# Patient Record
Sex: FEMALE
[De-identification: names, ages, dates, MRNs, and addresses within clinical notes are randomized per-mention and may not be internally consistent; named-entity substitution may affect disease eponyms.]

---

## 2020-02-20 PROBLEM — Z00.00 ENCOUNTER FOR PREVENTIVE HEALTH EXAMINATION: Status: ACTIVE | Noted: 2020-02-20

## 2020-03-06 ENCOUNTER — APPOINTMENT (OUTPATIENT)
Dept: VASCULAR SURGERY | Facility: CLINIC | Age: 45
End: 2020-03-06
Payer: COMMERCIAL

## 2020-03-06 VITALS
HEART RATE: 64 BPM | SYSTOLIC BLOOD PRESSURE: 112 MMHG | BODY MASS INDEX: 26.5 KG/M2 | RESPIRATION RATE: 16 BRPM | DIASTOLIC BLOOD PRESSURE: 66 MMHG | HEIGHT: 62 IN | WEIGHT: 144 LBS

## 2020-03-06 DIAGNOSIS — Z87.442 PERSONAL HISTORY OF URINARY CALCULI: ICD-10-CM

## 2020-03-06 DIAGNOSIS — Z78.9 OTHER SPECIFIED HEALTH STATUS: ICD-10-CM

## 2020-03-06 DIAGNOSIS — Z82.49 FAMILY HISTORY OF ISCHEMIC HEART DISEASE AND OTHER DISEASES OF THE CIRCULATORY SYSTEM: ICD-10-CM

## 2020-03-06 DIAGNOSIS — Z87.09 PERSONAL HISTORY OF OTHER DISEASES OF THE RESPIRATORY SYSTEM: ICD-10-CM

## 2020-03-06 DIAGNOSIS — Z77.22 CONTACT WITH AND (SUSPECTED) EXPOSURE TO ENVIRONMENTAL TOBACCO SMOKE (ACUTE) (CHRONIC): ICD-10-CM

## 2020-03-06 DIAGNOSIS — Z80.1 FAMILY HISTORY OF MALIGNANT NEOPLASM OF TRACHEA, BRONCHUS AND LUNG: ICD-10-CM

## 2020-03-06 PROCEDURE — 99204 OFFICE O/P NEW MOD 45 MIN: CPT

## 2020-03-06 RX ORDER — CHROMIUM 200 MCG
TABLET ORAL
Refills: 0 | Status: ACTIVE | COMMUNITY

## 2020-03-06 RX ORDER — MIRABEGRON 25 MG/1
25 TABLET, FILM COATED, EXTENDED RELEASE ORAL
Refills: 0 | Status: ACTIVE | COMMUNITY

## 2020-03-06 RX ORDER — MONTELUKAST SODIUM 10 MG/1
10 TABLET, FILM COATED ORAL
Refills: 0 | Status: ACTIVE | COMMUNITY

## 2020-03-06 RX ORDER — AZELASTINE HYDROCHLORIDE AND FLUTICASONE PROPIONATE 137; 50 UG/1; UG/1
137-50 SPRAY, METERED NASAL
Refills: 0 | Status: ACTIVE | COMMUNITY

## 2020-03-06 NOTE — REVIEW OF SYSTEMS
[Fever] : no fever [Chills] : no chills [Leg Claudication] : no intermittent leg claudication [Lower Ext Edema] : lower extremity edema [Limb Pain] : limb pain [Limb Swelling] : limb swelling [Limb Weakness] : no limb weakness [Difficulty Walking] : no difficulty walking

## 2020-03-06 NOTE — PHYSICAL EXAM
[JVD] : no jugular venous distention  [Normal Breath Sounds] : Normal breath sounds [2+] : left 2+ [Ankle Swelling (On Exam)] : present [Ankle Swelling On The Right] : mild [Varicose Veins Of Lower Extremities] : present [Varicose Veins Of The Right Leg] : of the right leg [Ankle Swelling On The Left] : moderate [] : bilaterally [Alert] : alert [Oriented to Person] : oriented to person [Oriented to Place] : oriented to place [Oriented to Time] : oriented to time [de-identified] : awake and Alert [de-identified] : varicose veins right posterior calf > 3mm, spider veins bl [de-identified] : appropriate

## 2020-03-06 NOTE — HISTORY OF PRESENT ILLNESS
[FreeTextEntry1] : 45 year old female  presents to the office with a chief complaint of right lower extremity edema and leg pain associated with varicose veins. She reports that her pain and swelling worsens with prolonged standing. She is a  and stands for prolonged periods of time. She  denies a history of DVT or SVT. She does not wear compression stockings. She reports that her edema improves with elevation. Her mother had varicose veins.\par

## 2020-03-06 NOTE — ASSESSMENT
[FreeTextEntry1] : 45 year  female with right lower extremity pain and swelling associated with varicose veins which worsens through out the day. I am uncertain if she has venous insufficiency. I recommended that she obtain and begin to wear compression stockings. She will elevate her leg as much as possible. She was scheduled for a venous duplex. She will follow up with me when the results of the duplex are available.

## 2020-03-16 ENCOUNTER — APPOINTMENT (OUTPATIENT)
Dept: HEART AND VASCULAR | Facility: CLINIC | Age: 45
End: 2020-03-16
Payer: COMMERCIAL

## 2020-03-16 PROCEDURE — 93970 EXTREMITY STUDY: CPT

## 2020-04-17 ENCOUNTER — APPOINTMENT (OUTPATIENT)
Dept: VASCULAR SURGERY | Facility: CLINIC | Age: 45
End: 2020-04-17

## 2020-05-18 ENCOUNTER — APPOINTMENT (OUTPATIENT)
Dept: VASCULAR SURGERY | Facility: CLINIC | Age: 45
End: 2020-05-18
Payer: COMMERCIAL

## 2020-05-18 PROCEDURE — 99202 OFFICE O/P NEW SF 15 MIN: CPT | Mod: 95

## 2020-05-18 NOTE — ASSESSMENT
[FreeTextEntry1] : 46 yo female with symptomatic venous insufficiency. She has continued pain and swelling in the right leg despite wearing a compression stocking. She has venous insufficiency on the right and a markedly enlarged right GSV. I recommended that she undergo an ablation of her right GSV. The risks and benefits of the procedure were discussed with the patient who agrees to proceed. She will continue to wear a compression stocking on a daily basis until the time of the procedure.

## 2020-05-18 NOTE — PHYSICAL EXAM
[Ankle Swelling (On Exam)] : present [Normal Breath Sounds] : Normal breath sounds [Varicose Veins Of Lower Extremities] : present [Ankle Swelling On The Right] : mild [Varicose Veins Of The Right Leg] : of the right leg [Alert] : alert [Ankle Swelling On The Left] : moderate [Oriented to Person] : oriented to person [Oriented to Place] : oriented to place [Oriented to Time] : oriented to time [de-identified] : Awake and Alert [de-identified] : varicose veins right posterior calf > 3mm [de-identified] : appropriate

## 2020-05-18 NOTE — REVIEW OF SYSTEMS
[Chills] : no chills [Fever] : no fever [Lower Ext Edema] : lower extremity edema [Limb Pain] : limb pain [Limb Swelling] : limb swelling [Limb Weakness] : no limb weakness [Difficulty Walking] : no difficulty walking

## 2020-05-18 NOTE — HISTORY OF PRESENT ILLNESS
[FreeTextEntry1] : 45 year old female  presents to the office with a chief complaint of right lower extremity edema and leg pain associated with varicose veins. She reports that her pain and swelling worsens with prolonged standing. She is a  and stands for prolonged periods of time. She  denies a history of DVT or SVT. She does not wear compression stockings. She reports that her edema improves with elevation. Her mother had varicose veins.\par  [de-identified] : Patient reports that she has been wearing a compression stocking on a daily basis. She reports increased pain and swelling in her right leg despite wearing a compression stocking. She reports that the pain is now waking her up in the middle of the night. She presents to discuss the results of her venous ultrasound and further treatment options.

## 2020-06-24 ENCOUNTER — APPOINTMENT (OUTPATIENT)
Dept: VASCULAR SURGERY | Facility: CLINIC | Age: 45
End: 2020-06-24
Payer: COMMERCIAL

## 2020-06-24 PROCEDURE — 36475 ENDOVENOUS RF 1ST VEIN: CPT

## 2020-06-24 RX ORDER — IBUPROFEN 600 MG/1
600 TABLET, FILM COATED ORAL 3 TIMES DAILY
Qty: 30 | Refills: 3 | Status: ACTIVE | COMMUNITY
Start: 2020-06-24 | End: 1900-01-01

## 2020-06-24 NOTE — PROCEDURE
[FreeTextEntry1] : RFA right GSV [FreeTextEntry3] : The patient was seen in the waiting room where the consent was obtained. She was then taken to the procedure room where a timeout was called to verify her identity and the laterality of the procedure. The patient's right leg was then interrogated under ultrasound and an appropriate place for cannulation was identified. Her right  leg was then prepped and draped in the standard fashion. Under ultrasound guidance the patient was given an injection of 1% plain lidocaine in the proximal calf. Access was then obtained with a 7 FR sheath in the standard fashion. Catheter was placed to the SFJ and withdrawn 3.0 cm from the junction. Patient was then given tumescence around the saphenous vein under ultrasound guidance. The ablation was then performed in the standard fashion. Steri strips were applied to catheter insertion site. Leg was wrapped in kerlix and an ace wrap. Patient was discharged in stable condition.\par  [FreeTextEntry2] : Symptomatic Venous Insufficiency

## 2020-06-24 NOTE — ADDENDUM
[FreeTextEntry1] : She tolerated the procedure. She verbalized understanding of the post procedure instructions.\par \par She will follow up in 1 week.\par

## 2020-07-06 ENCOUNTER — APPOINTMENT (OUTPATIENT)
Dept: VASCULAR SURGERY | Facility: CLINIC | Age: 45
End: 2020-07-06

## 2020-07-13 ENCOUNTER — APPOINTMENT (OUTPATIENT)
Dept: VASCULAR SURGERY | Facility: CLINIC | Age: 45
End: 2020-07-13
Payer: COMMERCIAL

## 2020-07-13 PROCEDURE — 99213 OFFICE O/P EST LOW 20 MIN: CPT

## 2020-07-13 PROCEDURE — 93971 EXTREMITY STUDY: CPT

## 2020-07-14 NOTE — PHYSICAL EXAM
[JVD] : no jugular venous distention  [Normal Breath Sounds] : Normal breath sounds [Normal Rate and Rhythm] : normal rate and rhythm [2+] : right 2+ [Ankle Swelling (On Exam)] : not present [Ankle Swelling Bilaterally] : bilaterally  [Varicose Veins Of Lower Extremities] : bilaterally [Ankle Swelling On The Right] : mild [] : bilaterally [Alert] : alert [Oriented to Person] : oriented to person [Oriented to Place] : oriented to place [Oriented to Time] : oriented to time [de-identified] : Awake and Alert [de-identified] : catheter insertion site with ecchymosis. spider veins bl [de-identified] : appropriate

## 2020-07-14 NOTE — HISTORY OF PRESENT ILLNESS
[FreeTextEntry1] : 44 yo female s/p RFA of the right GSV for symptomatic venous insufficiency. She reports that she is doing well. She denies fever or chills. She reports that she has bruising of the right calf. She is here to undergo a post procedure duplex. She took Motrin as prescribed. She continues to complain of spider veins bilaterally. She is interested in discussing additional treatment of her spider veins.

## 2020-07-14 NOTE — ASSESSMENT
[FreeTextEntry1] : 44 yo female s/p ablation of her right GSV. Her GSV is ablated 8.0 cm fro her SFJ. I do not think there is an indication to do anything at this time. She is an excellent candidate for sclerotherapy. The risks and benefits of sclerotherapy were discussed with the patient and she agrees to proceed. She will follow up in several weeks. She will bring compression stockings to her next visit.

## 2020-07-14 NOTE — REVIEW OF SYSTEMS
[Fever] : no fever [Chills] : no chills [Lower Ext Edema] : no lower extremity edema [Limb Pain] : no limb pain [Limb Swelling] : no limb swelling [Limb Weakness] : no limb weakness [Difficulty Walking] : no difficulty walking [de-identified] : ecchymosis

## 2020-08-17 ENCOUNTER — APPOINTMENT (OUTPATIENT)
Dept: VASCULAR SURGERY | Facility: CLINIC | Age: 45
End: 2020-08-17
Payer: COMMERCIAL

## 2020-08-17 VITALS — SYSTOLIC BLOOD PRESSURE: 115 MMHG | DIASTOLIC BLOOD PRESSURE: 77 MMHG

## 2020-08-17 PROCEDURE — 36471 NJX SCLRSNT MLT INCMPTNT VN: CPT

## 2020-08-17 NOTE — PROCEDURE
[FreeTextEntry2] : Spider Veins [FreeTextEntry1] : Sclerotherapy [FreeTextEntry3] : After consent was obtained. The patient underwent bilateral lower extremity sclerotherapy with 0.5% Polidocanol. The patient tolerated the procedure well. The patient was placed in compression stockings bilaterally. The patient was instructed to wear compression stockings continuously for the next 3 days and on a daily basis for 2 weeks. The patient will followup in 2 weeks sooner if they develop a problem.\par

## 2020-08-21 ENCOUNTER — APPOINTMENT (OUTPATIENT)
Dept: HEART AND VASCULAR | Facility: CLINIC | Age: 45
End: 2020-08-21
Payer: COMMERCIAL

## 2020-08-21 ENCOUNTER — APPOINTMENT (OUTPATIENT)
Dept: VASCULAR SURGERY | Facility: CLINIC | Age: 45
End: 2020-08-21
Payer: COMMERCIAL

## 2020-08-21 PROCEDURE — 99213 OFFICE O/P EST LOW 20 MIN: CPT

## 2020-08-21 PROCEDURE — 93971 EXTREMITY STUDY: CPT

## 2020-08-21 NOTE — REVIEW OF SYSTEMS
[Fever] : no fever [Chills] : no chills [Leg Claudication] : no intermittent leg claudication [Limb Pain] : no limb pain [Lower Ext Edema] : no lower extremity edema [Limb Swelling] : no limb swelling

## 2020-08-21 NOTE — HISTORY OF PRESENT ILLNESS
[FreeTextEntry1] : 44 yo female returns in follow up. She is s/p an ablation of her right GSV. HEr post procedure duplex demonstrated that the vein was ablated 7.5 cm from the SFJ. She reports some continued bruising of the right leg. She is s/p sclerotherapy and reports that she has been wearing the compression stockings as prescribed.

## 2020-08-21 NOTE — PHYSICAL EXAM
[Normal Breath Sounds] : Normal breath sounds [2+] : right 2+ [Ankle Swelling Bilaterally] : bilaterally  [Oriented to Place] : oriented to place [Alert] : alert [Oriented to Person] : oriented to person [Oriented to Time] : oriented to time [JVD] : no jugular venous distention  [Ankle Swelling (On Exam)] : not present [de-identified] : Awake and Alert [de-identified] : appropriate [de-identified] : mid calf with resolving ecchymosis , spider veins bl

## 2020-08-21 NOTE — ASSESSMENT
[FreeTextEntry1] : 46 yo female s/p ablation and sclerotherapy. Her GSV remains ablated in her mid thigh. There was no reflux seen proximally. I think it would be reasonable to continue to observe. If her edema worsens we will recheck a venous duplex and consider an ablation of the proximal segment. She will continue to wear compression stockings and follow up in several months for repeat sclerotherapy.

## 2020-08-21 NOTE — DATA REVIEWED
[FreeTextEntry1] : Venous Duplex right - GSV ablated  in mid thigh distally, no reflux in proximal thigh

## 2020-09-02 LAB
SARS-COV-2 IGG SERPL IA-ACNC: 0.08 INDEX
SARS-COV-2 IGG SERPL QL IA: NEGATIVE

## 2020-11-20 ENCOUNTER — APPOINTMENT (OUTPATIENT)
Dept: VASCULAR SURGERY | Facility: CLINIC | Age: 45
End: 2020-11-20
Payer: COMMERCIAL

## 2020-11-20 VITALS
HEIGHT: 62 IN | SYSTOLIC BLOOD PRESSURE: 102 MMHG | BODY MASS INDEX: 27.6 KG/M2 | DIASTOLIC BLOOD PRESSURE: 67 MMHG | RESPIRATION RATE: 16 BRPM | OXYGEN SATURATION: 97 % | HEART RATE: 64 BPM | WEIGHT: 150 LBS

## 2020-11-20 DIAGNOSIS — I87.2 VENOUS INSUFFICIENCY (CHRONIC) (PERIPHERAL): ICD-10-CM

## 2020-11-20 PROCEDURE — 99213 OFFICE O/P EST LOW 20 MIN: CPT

## 2020-11-20 RX ORDER — PSYLLIUM HUSK 0.4 G
CAPSULE ORAL
Refills: 0 | Status: DISCONTINUED | COMMUNITY
End: 2020-11-20

## 2020-11-22 PROBLEM — I87.2 VENOUS INSUFFICIENCY OF RIGHT LOWER EXTREMITY: Status: ACTIVE | Noted: 2020-03-06

## 2020-11-22 NOTE — PHYSICAL EXAM
[JVD] : no jugular venous distention  [Normal Breath Sounds] : Normal breath sounds [2+] : right 2+ [Ankle Swelling (On Exam)] : not present [Ankle Swelling Bilaterally] : bilaterally  [Varicose Veins Of Lower Extremities] : bilaterally [Ankle Swelling On The Right] : mild [Alert] : alert [Oriented to Person] : oriented to person [Oriented to Place] : oriented to place [Oriented to Time] : oriented to time [de-identified] : Awake and Alert [de-identified] :  spider veins bl [de-identified] : appropriate

## 2020-11-22 NOTE — ASSESSMENT
[FreeTextEntry1] : 44 yo female s/p ablation and sclerotherapy. Her lower extremity pain and swelling remains resolved. She has spider veins bilaterally. She is s/p sclerotherapy in the past and is an excellent candidate for repeat sclerotherapy. The risks and benefits were discussed with the patient who agrees to proceed. She will make an appointment at her convenience. She was instructed to bring compression stockings to the appointment.

## 2020-11-22 NOTE — REVIEW OF SYSTEMS
[Fever] : no fever [Chills] : no chills [Leg Claudication] : no intermittent leg claudication [Lower Ext Edema] : no lower extremity edema [Limb Pain] : no limb pain [Limb Swelling] : no limb swelling

## 2020-11-22 NOTE — HISTORY OF PRESENT ILLNESS
[FreeTextEntry1] : 46 yo female returns in follow up. She is s/p an ablation of her right GSV. Her post procedure duplex demonstrated that the vein was ablated 7.5 cm from the SFJ. She reports that her lower extremity pain is improved. She presents to discuss additional sclerotherapy.

## 2021-03-08 ENCOUNTER — APPOINTMENT (OUTPATIENT)
Dept: VASCULAR SURGERY | Facility: CLINIC | Age: 46
End: 2021-03-08
Payer: COMMERCIAL

## 2021-03-08 DIAGNOSIS — I83.93 ASYMPTOMATIC VARICOSE VEINS OF BILATERAL LOWER EXTREMITIES: ICD-10-CM

## 2021-03-08 PROCEDURE — 36471 NJX SCLRSNT MLT INCMPTNT VN: CPT | Mod: 50

## 2021-03-08 PROCEDURE — 99072 ADDL SUPL MATRL&STAF TM PHE: CPT

## 2021-03-09 PROBLEM — I83.93 SPIDER VEINS OF BOTH LOWER EXTREMITIES: Status: ACTIVE | Noted: 2020-07-14

## 2021-03-09 NOTE — ADDENDUM
[FreeTextEntry1] : She tolerated the procedure. She verbalized understanding of the post procedure instructions.\par \par She will follow up in 2-3 weeks.\par

## 2021-03-09 NOTE — PROCEDURE
[FreeTextEntry1] : Sclerotherapy [FreeTextEntry2] : Spider Veins [FreeTextEntry3] : After consent was obtained. The patient underwent bilateral lower extremity sclerotherapy with 0.5% Polidocanol. The patient tolerated the procedure well. The patient was placed in compression stockings bilaterally. The patient was instructed to wear compression stockings continuously for the next 3 days and on a daily basis for 2 weeks. The patient will followup in 2-3 weeks sooner if they develop a problem.\par